# Patient Record
Sex: FEMALE | Race: WHITE
[De-identification: names, ages, dates, MRNs, and addresses within clinical notes are randomized per-mention and may not be internally consistent; named-entity substitution may affect disease eponyms.]

---

## 2018-08-25 ENCOUNTER — HOSPITAL ENCOUNTER (EMERGENCY)
Dept: HOSPITAL 26 - MED | Age: 53
Discharge: HOME | End: 2018-08-25
Payer: MEDICAID

## 2018-08-25 VITALS
DIASTOLIC BLOOD PRESSURE: 87 MMHG | BODY MASS INDEX: 51.91 KG/M2 | SYSTOLIC BLOOD PRESSURE: 168 MMHG | HEIGHT: 63 IN | WEIGHT: 293 LBS

## 2018-08-25 VITALS — SYSTOLIC BLOOD PRESSURE: 132 MMHG | DIASTOLIC BLOOD PRESSURE: 52 MMHG

## 2018-08-25 DIAGNOSIS — Z79.1: ICD-10-CM

## 2018-08-25 DIAGNOSIS — E11.9: ICD-10-CM

## 2018-08-25 DIAGNOSIS — Z88.5: ICD-10-CM

## 2018-08-25 DIAGNOSIS — K21.9: ICD-10-CM

## 2018-08-25 DIAGNOSIS — R07.89: ICD-10-CM

## 2018-08-25 DIAGNOSIS — M54.5: ICD-10-CM

## 2018-08-25 DIAGNOSIS — I10: ICD-10-CM

## 2018-08-25 DIAGNOSIS — G89.29: Primary | ICD-10-CM

## 2018-08-25 DIAGNOSIS — Z79.899: ICD-10-CM

## 2018-08-25 LAB
ALBUMIN FLD-MCNC: 3.3 G/DL (ref 3.4–5)
ANION GAP SERPL CALCULATED.3IONS-SCNC: 8.8 MMOL/L (ref 8–16)
AST SERPL-CCNC: 19 U/L (ref 15–37)
BASOPHILS # BLD AUTO: 0.2 K/UL (ref 0–0.22)
BASOPHILS NFR BLD AUTO: 1.8 % (ref 0–2)
BILIRUB SERPL-MCNC: 0.2 MG/DL (ref 0–1)
BUN SERPL-MCNC: 10 MG/DL (ref 7–18)
CHLORIDE SERPL-SCNC: 106 MMOL/L (ref 98–107)
CK MB SERPL-MCNC: 1 NG/ML (ref 0–3.6)
CO2 SERPL-SCNC: 26.1 MMOL/L (ref 21–32)
CREAT SERPL-MCNC: 0.6 MG/DL (ref 0.6–1.3)
EOSINOPHIL # BLD AUTO: 0.4 K/UL (ref 0–0.4)
EOSINOPHIL NFR BLD AUTO: 4.3 % (ref 0–4)
ERYTHROCYTE [DISTWIDTH] IN BLOOD BY AUTOMATED COUNT: 15.3 % (ref 11.6–13.7)
GFR SERPL CREATININE-BSD FRML MDRD: 136 ML/MIN (ref 90–?)
GLUCOSE SERPL-MCNC: 210 MG/DL (ref 74–106)
HCT VFR BLD AUTO: 35.8 % (ref 36–48)
HGB BLD-MCNC: 12 G/DL (ref 12–16)
LIPASE SERPL-CCNC: 193 U/L (ref 73–393)
LYMPHOCYTES # BLD AUTO: 2.4 K/UL (ref 2.5–16.5)
LYMPHOCYTES NFR BLD AUTO: 25.6 % (ref 20.5–51.1)
MCH RBC QN AUTO: 30 PG (ref 27–31)
MCHC RBC AUTO-ENTMCNC: 33 G/DL (ref 33–37)
MCV RBC AUTO: 88.8 FL (ref 80–94)
MONOCYTES # BLD AUTO: 0.6 K/UL (ref 0.8–1)
MONOCYTES NFR BLD AUTO: 6.2 % (ref 1.7–9.3)
NEUTROPHILS # BLD AUTO: 5.7 K/UL (ref 1.8–7.7)
NEUTROPHILS NFR BLD AUTO: 62.1 % (ref 42.2–75.2)
PLATELET # BLD AUTO: 287 K/UL (ref 140–450)
POTASSIUM SERPL-SCNC: 3.9 MMOL/L (ref 3.5–5.1)
RBC # BLD AUTO: 4.03 MIL/UL (ref 4.2–5.4)
SODIUM SERPL-SCNC: 137 MMOL/L (ref 136–145)
WBC # BLD AUTO: 9.2 K/UL (ref 4.8–10.8)

## 2018-08-25 PROCEDURE — 84484 ASSAY OF TROPONIN QUANT: CPT

## 2018-08-25 PROCEDURE — 82550 ASSAY OF CK (CPK): CPT

## 2018-08-25 PROCEDURE — 82948 REAGENT STRIP/BLOOD GLUCOSE: CPT

## 2018-08-25 PROCEDURE — 99285 EMERGENCY DEPT VISIT HI MDM: CPT

## 2018-08-25 PROCEDURE — 83690 ASSAY OF LIPASE: CPT

## 2018-08-25 PROCEDURE — 71045 X-RAY EXAM CHEST 1 VIEW: CPT

## 2018-08-25 PROCEDURE — 36415 COLL VENOUS BLD VENIPUNCTURE: CPT

## 2018-08-25 PROCEDURE — 85025 COMPLETE CBC W/AUTO DIFF WBC: CPT

## 2018-08-25 PROCEDURE — 93005 ELECTROCARDIOGRAM TRACING: CPT

## 2018-08-25 PROCEDURE — 80053 COMPREHEN METABOLIC PANEL: CPT

## 2018-08-25 PROCEDURE — 82553 CREATINE MB FRACTION: CPT

## 2018-08-25 NOTE — NUR
resting semi-alvarez's position---holding conversation with family at bedside 
without s/s resp distress noted. continues to wait for dispo.

labs results pending

will continue to monitor and observe for pain control.

## 2018-08-25 NOTE — NUR
Patient discharged with v/s stable. Written and verbal after care instructions 
given and explained. 

Patient alert, oriented and verbalized understanding of instructions. 
Ambulatory with steady gait. All questions addressed prior to discharge. ID 
band removed. Patient advised to follow up with PMD. Rx of TRAMADOL  given. 
Patient educated on indication of medication including possible reaction and 
side effects. Opportunity to ask questions provided and answered.

## 2018-08-25 NOTE — NUR
PT. AMBULATED TO RESTROOM WITH STEADY GAIT , RR EVEN AND UNLABORED. NO 
COMPLAINTS OF DIZZYNESS AT THIS TIME. WILL CONTINUE TO MONITOR.

## 2018-08-25 NOTE — NUR
PATIENT IS A 52 Y/O FEMALE WHO PRESENTS TO THE ED C/O CHEST PAIN. PT STATES 
THAT IT STARTED LAST NIGHT AT 2000, REPORTED AN EPISODE OF HYPOGLYCEMIA. PT 
REPORTS 8/10 ACHING CHEST PAIN THAT DOES NOT RADIATE. PT DENIES CP, SOB, 
REPORTS NAUSEA DENIES VOMITING/DIARRHEA. PT AWAKE, ALERT, RR EVEN/UNLABORED. PT 
REPOSITIONED FOR COMFORT, BED IN LOWEST POSITION. ER MD DR. LEAL NOTIFIED. 
WILL CONTINUE TO MONITOR.

## 2018-12-13 ENCOUNTER — HOSPITAL ENCOUNTER (EMERGENCY)
Dept: HOSPITAL 26 - MED | Age: 53
Discharge: HOME | End: 2018-12-13
Payer: MEDICAID

## 2018-12-13 VITALS — HEIGHT: 63 IN | WEIGHT: 289 LBS | BODY MASS INDEX: 51.21 KG/M2

## 2018-12-13 VITALS — SYSTOLIC BLOOD PRESSURE: 144 MMHG | DIASTOLIC BLOOD PRESSURE: 77 MMHG

## 2018-12-13 VITALS — SYSTOLIC BLOOD PRESSURE: 112 MMHG | DIASTOLIC BLOOD PRESSURE: 60 MMHG

## 2018-12-13 DIAGNOSIS — K21.9: ICD-10-CM

## 2018-12-13 DIAGNOSIS — Z88.5: ICD-10-CM

## 2018-12-13 DIAGNOSIS — E11.9: ICD-10-CM

## 2018-12-13 DIAGNOSIS — M19.90: ICD-10-CM

## 2018-12-13 DIAGNOSIS — I10: ICD-10-CM

## 2018-12-13 DIAGNOSIS — S90.32XA: Primary | ICD-10-CM
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
## 2018-12-13 DIAGNOSIS — Z79.82: ICD-10-CM

## 2018-12-13 DIAGNOSIS — Z79.899: ICD-10-CM

## 2018-12-13 DIAGNOSIS — Z90.49: ICD-10-CM
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
## 2018-12-13 PROCEDURE — 99283 EMERGENCY DEPT VISIT LOW MDM: CPT

## 2018-12-13 PROCEDURE — 73630 X-RAY EXAM OF FOOT: CPT

## 2018-12-13 RX ADMIN — ACETAMINOPHEN ONE MG: 325 TABLET ORAL at 15:57

## 2020-03-18 ENCOUNTER — HOSPITAL ENCOUNTER (EMERGENCY)
Dept: HOSPITAL 26 - MED | Age: 55
Discharge: HOME | End: 2020-03-18
Payer: MEDICAID

## 2020-03-18 VITALS — DIASTOLIC BLOOD PRESSURE: 97 MMHG | SYSTOLIC BLOOD PRESSURE: 157 MMHG

## 2020-03-18 VITALS — WEIGHT: 293 LBS | HEIGHT: 63 IN | BODY MASS INDEX: 51.91 KG/M2

## 2020-03-18 DIAGNOSIS — S92.351A: Primary | ICD-10-CM

## 2020-03-18 DIAGNOSIS — I10: ICD-10-CM

## 2020-03-18 DIAGNOSIS — Z79.84: ICD-10-CM

## 2020-03-18 DIAGNOSIS — Z79.82: ICD-10-CM

## 2020-03-18 DIAGNOSIS — Z88.5: ICD-10-CM
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
## 2020-03-18 DIAGNOSIS — Z79.899: ICD-10-CM

## 2020-03-18 DIAGNOSIS — S60.416A: ICD-10-CM

## 2020-03-18 DIAGNOSIS — K21.9: ICD-10-CM
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
## 2020-03-18 DIAGNOSIS — E11.9: ICD-10-CM

## 2020-03-18 PROCEDURE — 73630 X-RAY EXAM OF FOOT: CPT

## 2020-03-18 PROCEDURE — 29515 APPLICATION SHORT LEG SPLINT: CPT

## 2020-03-18 PROCEDURE — 99284 EMERGENCY DEPT VISIT MOD MDM: CPT

## 2020-03-18 PROCEDURE — 96372 THER/PROPH/DIAG INJ SC/IM: CPT

## 2020-03-18 PROCEDURE — 73130 X-RAY EXAM OF HAND: CPT

## 2020-03-18 NOTE — NUR
Patient discharged with v/s stable. Written and verbal after care instructions 
given and explained. Patient alert, oriented and verbalized understanding of 
instructions. Ambulatory with steady gait. All questions addressed prior to 
discharge. ID band removed. Patient advised to follow up with PMD. Rx of Norco 
5mg and Ibuprofen 600mg was given. Patient educated on indication of medication 
including possible reaction and side effects. Opportunity to ask questions 
provided and answered.

## 2020-03-18 NOTE — NUR
C/O LEFT FOOT PAIN S/P FALL  WHILE WALKING PT STATES X 20 MINUTES AGO.   DENIES 
LOC.

PMH: DM, HTN, HIGH CHOLESTEROL. PATIENT STATES PAIN OF 10/10 AT THIS TIME. 
PATIENT POSITIONED FOR COMFORT; HOB ELEVATED; BEDRAILS UP X1; BED DOWN. ER MD 
MADE AWARE OF PT STATUS.

## 2020-03-18 NOTE — NUR
SHORT LEG POSTERIOR SPLINT PLACED ON PT'S LEFT FOOT, AND GIVEN ONE ON ONE 
INSTRUCTION ON PROPER CRUTCH USE. PT STATES SHE HAS USED CRUTCHES BEFORE IN THE 
PAST.